# Patient Record
Sex: MALE | Race: ASIAN | URBAN - METROPOLITAN AREA
[De-identification: names, ages, dates, MRNs, and addresses within clinical notes are randomized per-mention and may not be internally consistent; named-entity substitution may affect disease eponyms.]

---

## 2019-09-04 ENCOUNTER — OFFICE VISIT - HEALTHEAST (OUTPATIENT)
Dept: INTERNAL MEDICINE | Facility: CLINIC | Age: 24
End: 2019-09-04

## 2019-09-04 DIAGNOSIS — S06.0X0A CONCUSSION WITHOUT LOSS OF CONSCIOUSNESS, INITIAL ENCOUNTER: ICD-10-CM

## 2019-09-04 ASSESSMENT — MIFFLIN-ST. JEOR: SCORE: 1700.67

## 2020-02-13 ENCOUNTER — COMMUNICATION - HEALTHEAST (OUTPATIENT)
Dept: SCHEDULING | Facility: CLINIC | Age: 25
End: 2020-02-13

## 2020-02-14 ENCOUNTER — OFFICE VISIT - HEALTHEAST (OUTPATIENT)
Dept: INTERNAL MEDICINE | Facility: CLINIC | Age: 25
End: 2020-02-14

## 2020-02-14 DIAGNOSIS — R07.89 CHEST WALL PAIN: ICD-10-CM

## 2020-02-14 DIAGNOSIS — D22.62 MELANOCYTIC NEVUS OF LEFT SHOULDER: ICD-10-CM

## 2020-02-14 ASSESSMENT — MIFFLIN-ST. JEOR: SCORE: 1709.74

## 2020-03-09 ENCOUNTER — RECORDS - HEALTHEAST (OUTPATIENT)
Dept: ADMINISTRATIVE | Facility: OTHER | Age: 25
End: 2020-03-09
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
## 2021-06-03 VITALS
SYSTOLIC BLOOD PRESSURE: 128 MMHG | BODY MASS INDEX: 21.56 KG/M2 | TEMPERATURE: 98 F | OXYGEN SATURATION: 98 % | HEART RATE: 59 BPM | WEIGHT: 154 LBS | HEIGHT: 71 IN | DIASTOLIC BLOOD PRESSURE: 64 MMHG

## 2021-06-04 VITALS
HEART RATE: 78 BPM | WEIGHT: 156 LBS | TEMPERATURE: 98 F | SYSTOLIC BLOOD PRESSURE: 102 MMHG | DIASTOLIC BLOOD PRESSURE: 78 MMHG | BODY MASS INDEX: 21.84 KG/M2 | OXYGEN SATURATION: 99 % | HEIGHT: 71 IN

## 2021-06-06 NOTE — PATIENT INSTRUCTIONS - HE
Muscular Pain and tenderness to external pressure, also pain with deep breaths, localized to the left anterior chest, approximately over the third or fourth rib along the midclavicular line, source of the pain is likely muscular (either pectoralis or intercostal muscles); I do not suspect an internal thoracic cause.    Story is that he noticed this pain about a week and a half ago, when he was in [place] at altitude.  He does not recall having had any trauma to the area such as a fall, or doing any vigorous exercise that could have triggered it.  The discomfort is worse with a deep breath.    Here in the exam room, we do detect a tender spot, which on palpation seems to overlie his rib, on the left side.  Lungs are clear, heart regular rate and rhythm, oxygen saturation 99%.    I told him that I expect his pain to go away over the next week or so.  I am not sure why is been there so long.  He could do some gentle massage to the area, also could use a over-the-counter nonsteroidal such as ibuprofen or Aleve.    I told him he can go ahead and start exercising as tolerated.  Maybe it will get better.    Darkly pigmented mole about 6 mm on his left superior posterior shoulder.  He told he that he has had a dark lesion in the area probably since birth, about 6 mm.  He recalls having seen a dermatologist before, had measurements taken, but this was several years ago.  He does not think the lesion has been evolving, but he cannot see the area very well.  I would place a referral to dermatology, so that he can have this formally examined, and even consider having it removed, so he no longer needs to worry about it, particularly since it is in an area that he cannot see well.     Symptomatically recovered from mild concussion, which is the reason I saw him September 4, 2019

## 2021-06-06 NOTE — TELEPHONE ENCOUNTER
Jarod is calling and states he is having chest pain which started one week ago.  When he takes a deep breath (meaning really expanding chest) he feels the chest pain on the left side.  Denies shortness of breath and chest pain currently.  Jarod is requesting to schedule an appointment as he does no wish to go to ED.       Reason for Disposition    [1] Chest pain lasts > 5 minutes AND [2] occurred > 3 days ago (72 hours) AND [3] NO chest pain or cardiac symptoms now    Protocols used: CHEST PAIN-A-AH

## 2021-06-06 NOTE — PROGRESS NOTES
Office Visit - Follow Up   Jarod Hughes   24 y.o. male    Date of Visit: 2/14/2020    Chief Complaint   Patient presents with     Chest Pain     Off and on for 1 week with deep breaths, no cough or other symptoms        Assessment and Plan     Muscular Pain and tenderness to external pressure, also pain with deep breaths, localized to the left anterior chest, approximately over the third or fourth rib along the midclavicular line, source of the pain is likely muscular (either pectoralis or intercostal muscles); I do not suspect an internal thoracic cause.    Story is that he noticed this pain about a week and a half ago, when he was in [place] at altitude.  He does not recall having had any trauma to the area such as a fall, or doing any vigorous exercise that could have triggered it.  The discomfort is worse with a deep breath.    Here in the exam room, we do detect a tender spot, which on palpation seems to overlie his rib, on the left side.  Lungs are clear, heart regular rate and rhythm, oxygen saturation 99%.     I told him that I expect his pain to go away over the next week or so.  I am not sure why is been there so long.  He could do some gentle massage to the area, also could use a over-the-counter nonsteroidal such as ibuprofen or Aleve.    I told him he can go ahead and start exercising as tolerated.  Maybe it will get better.    Darkly pigmented mole about 6 mm on his left superior posterior shoulder.  He told he that he has had a dark lesion in the area probably since birth, about 6 mm.  He recalls having seen a dermatologist before, had measurements taken, but this was several years ago.  He does not think the lesion has been evolving, but he cannot see the area very well.  I would place a referral to dermatology, so that he can have this formally examined, and even consider having it removed, so he no longer needs to worry about it, particularly since it is in an area that he cannot see well.  "    Symptomatically recovered from mild concussion, which is the reason I saw him September 4, 2019           History of Present Illness   This 24 y.o. old man comes to clinic for evaluation of     tenderness to external pressure, also pain with deep breaths, localized to the left anterior chest, approximately over the third or fourth rib along the midclavicular line, source of the pain is likely muscular (either pectoralis or intercostal muscles); I do not suspect an internal thoracic cause.    Story is that he noticed this pain about a week and a half ago, when he was in [place] at altitude.  He does not recall having had any trauma to the area such as a fall, or doing any vigorous exercise that could have triggered it.  The discomfort is worse with a deep breath.    Previous visit with Me  9-4-19  For mild concussion, symptoms of now resolved.       Medications, Allergies, Social, and Problem List   Current Outpatient Medications   Medication Sig Dispense Refill     FLUCELVAX QUAD [id number], PF, 60 mcg (15 mcg x 4)/0.5 mL Syrg ADM 0.5ML IM UTD       No current facility-administered medications for this visit.      No Known Allergies  Social History     Tobacco Use     Smoking status: Never Smoker     Smokeless tobacco: Never Used   Substance Use Topics     Alcohol use: Yes     Frequency: 2-4 times a month     Drinks per session: 1 or 2     Drug use: Not on file     There is no problem list on file for this patient.       Reviewed, reconciled and updated       Physical Exam   General Appearance: Athletic appearing young man, appears well, completely comfortable, breathing unlabored    /78 (Patient Site: Left Arm, Patient Position: Sitting)   Pulse 78   Temp 98  F (36.7  C)   Ht 5' 11\" (1.803 m)   Wt 156 lb (70.8 kg)   SpO2 99%   BMI 21.76 kg/m      Here in the exam room, we do detect a tender spot, which on palpation seems to overlie his rib, on the left side.  Lungs are clear, heart regular rate and " rhythm, oxygen saturation 99%.          Additional Information        Vin Partida MD

## 2021-08-21 ENCOUNTER — HEALTH MAINTENANCE LETTER (OUTPATIENT)
Age: 26
End: 2021-08-21

## 2021-10-16 ENCOUNTER — HEALTH MAINTENANCE LETTER (OUTPATIENT)
Age: 26
End: 2021-10-16

## 2022-10-01 ENCOUNTER — HEALTH MAINTENANCE LETTER (OUTPATIENT)
Age: 27
End: 2022-10-01

## 2023-10-15 ENCOUNTER — HEALTH MAINTENANCE LETTER (OUTPATIENT)
Age: 28
End: 2023-10-15